# Patient Record
Sex: MALE | Race: WHITE | NOT HISPANIC OR LATINO | Employment: STUDENT | URBAN - METROPOLITAN AREA
[De-identification: names, ages, dates, MRNs, and addresses within clinical notes are randomized per-mention and may not be internally consistent; named-entity substitution may affect disease eponyms.]

---
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
## 2024-01-30 ENCOUNTER — OFFICE VISIT (OUTPATIENT)
Dept: PEDIATRICS | Facility: CLINIC | Age: 19
End: 2024-01-30
Payer: COMMERCIAL

## 2024-01-30 VITALS — TEMPERATURE: 100 F | BODY MASS INDEX: 24.83 KG/M2 | OXYGEN SATURATION: 98 % | WEIGHT: 178 LBS

## 2024-01-30 DIAGNOSIS — B99.9 FEVER DUE TO INFECTION: Primary | ICD-10-CM

## 2024-01-30 LAB — POC RAPID STREP: NEGATIVE

## 2024-01-30 PROCEDURE — 87651 STREP A DNA AMP PROBE: CPT

## 2024-01-30 PROCEDURE — 87880 STREP A ASSAY W/OPTIC: CPT | Performed by: PEDIATRICS

## 2024-01-30 PROCEDURE — 99213 OFFICE O/P EST LOW 20 MIN: CPT | Performed by: PEDIATRICS

## 2024-01-30 PROCEDURE — 87636 SARSCOV2 & INF A&B AMP PRB: CPT

## 2024-01-30 NOTE — PROGRESS NOTES
Subjective   Christian Pat is a 18 y.o. male who presents for Cough (Cough/congestion/flu/covid test-covid symptoms    Here alone ).  Today he is accompanied by alone.     HPI  Chills, ? fever, cough, congested, ST. Fatigue, started 2 days ago.    Objective   Temp 37.8 °C (100 °F) (Tympanic)   Wt 80.7 kg (178 lb)   SpO2 98%   BMI 24.83 kg/m²     Growth percentiles: No height on file for this encounter. 80 %ile (Z= 0.85) based on CDC (Boys, 2-20 Years) weight-for-age data using vitals from 1/30/2024.     Physical Exam  Alert in NAD  Tms clear  Nasal mucosa swollen, + congestion  Post OP erythema  Shotty b/l ant cerv LAD  RRR S1S2  CTAB  Abd soft NTND       Assessment/Plan   Diagnoses and all orders for this visit:  Fever due to infection  -     Sars-CoV-2 and Influenza A/B PCR  -     Group A Streptococcus, PCR  -     POCT rapid strep A      Return to care for new fever, difficulty breathing, decreased hydration/urine output, or other new or worrisome symptoms.

## 2024-01-31 LAB
FLUAV RNA RESP QL NAA+PROBE: NOT DETECTED
FLUBV RNA RESP QL NAA+PROBE: NOT DETECTED
S PYO DNA THROAT QL NAA+PROBE: NOT DETECTED
SARS-COV-2 RNA RESP QL NAA+PROBE: NOT DETECTED

## 2024-02-03 ENCOUNTER — TELEPHONE (OUTPATIENT)
Dept: PEDIATRICS | Facility: CLINIC | Age: 19
End: 2024-02-03
Payer: COMMERCIAL

## 2024-02-03 NOTE — TELEPHONE ENCOUNTER
Pt calling with update-would like to talk to you.    - spoke w/ pt:  no longer fevers but still w/ magaly and white stuff on tonsils - staying hydrated - disc possibility of Mono but more likely a winter URI virus so cont sx tx and discussed when to call

## 2024-12-18 ENCOUNTER — OFFICE VISIT (OUTPATIENT)
Dept: PEDIATRICS | Facility: CLINIC | Age: 19
End: 2024-12-18
Payer: COMMERCIAL

## 2024-12-18 VITALS
HEART RATE: 83 BPM | HEIGHT: 70 IN | TEMPERATURE: 97.6 F | BODY MASS INDEX: 24.95 KG/M2 | WEIGHT: 174.25 LBS | OXYGEN SATURATION: 96 %

## 2024-12-18 DIAGNOSIS — J02.9 PHARYNGITIS, UNSPECIFIED ETIOLOGY: ICD-10-CM

## 2024-12-18 DIAGNOSIS — R53.83 OTHER FATIGUE: ICD-10-CM

## 2024-12-18 DIAGNOSIS — J01.90 ACUTE NON-RECURRENT SINUSITIS, UNSPECIFIED LOCATION: Primary | ICD-10-CM

## 2024-12-18 DIAGNOSIS — J02.9 SORE THROAT: ICD-10-CM

## 2024-12-18 PROBLEM — M79.661 PAIN OF RIGHT LOWER LEG: Status: ACTIVE | Noted: 2024-12-18

## 2024-12-18 PROBLEM — S89.90XA INJURY OF LOWER LEG: Status: ACTIVE | Noted: 2024-12-18

## 2024-12-18 PROBLEM — M21.70 LEG LENGTH DISCREPANCY: Status: ACTIVE | Noted: 2024-12-18

## 2024-12-18 PROBLEM — M51.26 LUMBAR DISC HERNIATION: Status: ACTIVE | Noted: 2024-12-18

## 2024-12-18 PROBLEM — S86.919A MUSCLE STRAIN OF LOWER EXTREMITY: Status: ACTIVE | Noted: 2024-12-18

## 2024-12-18 PROBLEM — M89.8X6 TIBIAL PAIN: Status: ACTIVE | Noted: 2024-12-18

## 2024-12-18 PROBLEM — M67.88 ACHILLES TENDINOSIS: Status: ACTIVE | Noted: 2024-12-18

## 2024-12-18 PROBLEM — M51.369 BULGING LUMBAR DISC: Status: ACTIVE | Noted: 2024-12-18

## 2024-12-18 PROBLEM — S86.011A STRAIN OF RIGHT ACHILLES TENDON: Status: ACTIVE | Noted: 2024-12-18

## 2024-12-18 PROBLEM — M25.519 SHOULDER PAIN: Status: ACTIVE | Noted: 2024-12-18

## 2024-12-18 PROBLEM — S76.309A HAMSTRING INJURY: Status: ACTIVE | Noted: 2024-12-18

## 2024-12-18 PROBLEM — R07.81 RIB PAIN: Status: ACTIVE | Noted: 2024-12-18

## 2024-12-18 PROBLEM — B99.9 FEVER DUE TO INFECTION: Status: ACTIVE | Noted: 2024-12-18

## 2024-12-18 PROBLEM — S93.411A SPRAIN OF CALCANEOFIBULAR LIGAMENT OF RIGHT ANKLE: Status: ACTIVE | Noted: 2024-12-18

## 2024-12-18 PROBLEM — R60.9 SWELLING: Status: ACTIVE | Noted: 2024-12-18

## 2024-12-18 PROBLEM — M62.89 HAMSTRING TIGHTNESS: Status: ACTIVE | Noted: 2024-12-18

## 2024-12-18 PROBLEM — M43.10 RETROLISTHESIS: Status: ACTIVE | Noted: 2024-12-18

## 2024-12-18 PROBLEM — M21.70 INEQUALITY OF LENGTH OF LOWER EXTREMITY: Status: ACTIVE | Noted: 2023-07-05

## 2024-12-18 PROBLEM — S93.491A HIGH ANKLE SPRAIN OF RIGHT LOWER EXTREMITY: Status: ACTIVE | Noted: 2024-12-18

## 2024-12-18 PROBLEM — M21.079 EVERSION DEFORMITY OF FOOT: Status: ACTIVE | Noted: 2024-12-18

## 2024-12-18 PROBLEM — M67.979 ACHILLES TENDON DISORDER: Status: ACTIVE | Noted: 2024-12-18

## 2024-12-18 PROBLEM — M25.569 KNEE PAIN: Status: ACTIVE | Noted: 2024-12-18

## 2024-12-18 PROBLEM — M25.9 DISORDER OF HIP REGION: Status: ACTIVE | Noted: 2024-12-18

## 2024-12-18 PROBLEM — M46.46 DISCITIS OF LUMBAR REGION: Status: ACTIVE | Noted: 2024-12-18

## 2024-12-18 PROBLEM — M79.606 PAIN OF LOWER EXTREMITY: Status: ACTIVE | Noted: 2024-12-18

## 2024-12-18 PROBLEM — M79.651 PAIN OF RIGHT THIGH: Status: ACTIVE | Noted: 2024-12-18

## 2024-12-18 PROBLEM — M25.579 ACUTE ANKLE PAIN: Status: ACTIVE | Noted: 2024-12-18

## 2024-12-18 PROBLEM — S83.521A SPRAIN OF POSTERIOR CRUCIATE LIGAMENT OF RIGHT KNEE: Status: ACTIVE | Noted: 2024-12-18

## 2024-12-18 PROBLEM — M54.50 LOW BACK PAIN, UNSPECIFIED: Status: ACTIVE | Noted: 2024-12-18

## 2024-12-18 PROBLEM — Q66.6 VALGUS DEFORMITY OF BOTH FEET: Status: ACTIVE | Noted: 2024-12-18

## 2024-12-18 PROBLEM — S92.909A FRACTURE OF FOOT: Status: ACTIVE | Noted: 2024-12-18

## 2024-12-18 PROBLEM — M92.521: Status: ACTIVE | Noted: 2024-12-18

## 2024-12-18 PROBLEM — S76.311A RIGHT HAMSTRING MUSCLE STRAIN: Status: ACTIVE | Noted: 2024-12-18

## 2024-12-18 PROBLEM — M47.9 SPONDYLOSIS: Status: ACTIVE | Noted: 2023-07-24

## 2024-12-18 PROBLEM — S83.8X1A INJURY OF MENISCUS OF RIGHT KNEE: Status: ACTIVE | Noted: 2024-12-18

## 2024-12-18 PROBLEM — S86.111A: Status: ACTIVE | Noted: 2024-12-18

## 2024-12-18 LAB — POC RAPID STREP: NEGATIVE

## 2024-12-18 PROCEDURE — 99214 OFFICE O/P EST MOD 30 MIN: CPT | Performed by: PEDIATRICS

## 2024-12-18 PROCEDURE — 87651 STREP A DNA AMP PROBE: CPT

## 2024-12-18 PROCEDURE — 87880 STREP A ASSAY W/OPTIC: CPT | Performed by: PEDIATRICS

## 2024-12-18 PROCEDURE — 3008F BODY MASS INDEX DOCD: CPT | Performed by: PEDIATRICS

## 2024-12-18 RX ORDER — AMOXICILLIN 875 MG/1
875 TABLET, FILM COATED ORAL 2 TIMES DAILY
Qty: 20 TABLET | Refills: 0 | Status: SHIPPED | OUTPATIENT
Start: 2024-12-18 | End: 2024-12-28

## 2024-12-18 RX ORDER — MELOXICAM 15 MG/1
TABLET ORAL
COMMUNITY

## 2024-12-18 RX ORDER — CHOLECALCIFEROL (VITAMIN D3) 125 MCG
CAPSULE ORAL
COMMUNITY

## 2024-12-18 RX ORDER — TIZANIDINE 4 MG/1
TABLET ORAL
COMMUNITY

## 2024-12-18 NOTE — PROGRESS NOTES
"Subjective   History was provided by the patient.  Christian Pat is here today w/ complaint of sore throat.   Symptoms began 2 weeks ago but worse x 3d  Fever is absent  Other associated symptoms have included cough, nasal congestion.  No SOB  Fluid intake is good.    There has not been contact with an individual with known Strept throat.    Current medications include multi-symptom cold medications (Mucinex), no pain meds     Objective   Pulse 83   Temp 36.4 °C (97.6 °F)   Ht 1.765 m (5' 9.5\")   Wt 79 kg (174 lb 4 oz)   SpO2 96%   BMI 25.36 kg/m²   General: alert, active, in no acute distress  Eyes:  scleral injection No  Ears: TM's normal, external auditory canals are clear   Nose: turbinates erythematous  Throat: tonsils: 3+  and without exudates, moderate erythema  Neck: supple, adenopathy present: single,  left, anterior cervical, medium, mobile, tender  Lungs: good aeration throughout all lung fields, no retractions, no nasal flaring, and clear breath sounds bilaterally  Heart: regular rate and rhythm, normal S1 and S2, no murmur  Abd:  soft, nontender, or no HSM    Assessment/Plan   Christian Pat is a 19 y.o. male here w/ viral syndr vs ?sinusitis + fatigue  QS negative.  Strept PCR ordered.  If ordered, parents/pt told to check in MyChart for result and if POS then we will contact them with antibiotic instructions.  Recommended OTC tx and fluids  Amoxicillin x 10d  "

## 2024-12-18 NOTE — ASSESSMENT & PLAN NOTE
- occurred in hs during fb - L4-S1  - on mm relaxer tizanidine still  - rx per ortho (but hasn't seen them in a while)

## 2024-12-18 NOTE — PROGRESS NOTES
"Subjective   History was provided by the  pt .  Christian Pat is a 19 y.o. male who is here for this well visit.  - no WCC > 2.5yrs or more  - Flu + lipids + HIV + hrg  - gave VIS for HPV + Nancy     Current Issues:  Current concerns include:  on amox for presumed sinusitis x 5/10d, feeling much better  - L CP x 90m o/n, last night and 1+wks prior, usually around dinner or after, no hx GERD but ? - no tx tried so disc trying Tums and staying upright and disc when to call  - optometry/glasses  Lumbar disc herniation  - occurred in hs during fb - L4-S1  - on mm relaxer tizanidine still  - rx per ortho (but hasn't seen them in a while)    High ankle sprain of right lower extremity  - still some pain on/off but not limiting    Sleep: all night  Dental:  brushes teeth 2x/d - sees dentist  No issues using restroom  Testicular self-exams discussed    Review of Nutrition:  Current diet: adequate milk/Vit D source   Adequate fruit/vegetable intake    Social Screening:   Grade:  soph[employer] for:  finance  Activities:  gets regular exercise (lift and bask)  Job:  Yes    Safety:  Risk factors for sexually-transmitted infections: sexually active - F only - no hx STD - denies needing tested today  Using alcohol/drug use:  yes - EtOH 1-2x/wk - mj much less  Tobacco/nicotine use:  no use  Uses seat belt - no texting while driving    Screening Questions:  Mood (see PHQ9): good    Objective   /83   Pulse 76   Ht 1.753 m (5' 9\")   Wt 79.8 kg (176 lb)   BMI 25.99 kg/m²   Physical Exam  Constitutional:       Appearance: Normal appearance.   HENT:      Right Ear: Tympanic membrane normal.      Left Ear: Tympanic membrane normal.      Nose: Nose normal.      Mouth/Throat:      Mouth: Mucous membranes are moist.      Pharynx: Oropharynx is clear.   Eyes:      Extraocular Movements: Extraocular movements intact.   Cardiovascular:      Rate and Rhythm: Normal rate and regular rhythm.      Pulses:           Radial pulses are 2+ on " the right side and 2+ on the left side.      Heart sounds: No murmur heard.  Pulmonary:      Effort: Pulmonary effort is normal.      Breath sounds: Normal breath sounds.   Abdominal:      General: Abdomen is flat.      Palpations: Abdomen is soft. There is no hepatomegaly, splenomegaly or mass.   Genitourinary:     Penis: Normal.       Testes: Normal.         Right: Testicular hydrocele not present.         Left: Testicular hydrocele not present.      Nestor stage (genital): 5.   Musculoskeletal:         General: Normal range of motion.      Cervical back: Normal range of motion and neck supple.      Thoracic back: No scoliosis.      Lumbar back: No scoliosis.   Lymphadenopathy:      Cervical: No cervical adenopathy.   Skin:     General: Skin is warm and dry.   Neurological:      General: No focal deficit present.      Mental Status: He is alert.      Deep Tendon Reflexes:      Reflex Scores:       Patellar reflexes are 2+ on the right side and 2+ on the left side.  Psychiatric:         Mood and Affect: Mood normal.         Behavior: Behavior normal.       Assessment/Plan   19 y.o. male w/ NL G+D  1. Anticipatory guidance discussed.   2. Cleared for school/sports  3. Vaccine, if given, discussed and consented.  4. Follow up in 1 year for next well child exam or sooner with concerns.

## 2024-12-19 LAB — S PYO DNA THROAT QL NAA+PROBE: NOT DETECTED

## 2024-12-23 ENCOUNTER — APPOINTMENT (OUTPATIENT)
Dept: PEDIATRICS | Facility: CLINIC | Age: 19
End: 2024-12-23
Payer: COMMERCIAL

## 2024-12-23 VITALS
WEIGHT: 176 LBS | SYSTOLIC BLOOD PRESSURE: 118 MMHG | HEIGHT: 69 IN | DIASTOLIC BLOOD PRESSURE: 83 MMHG | BODY MASS INDEX: 26.07 KG/M2 | HEART RATE: 76 BPM

## 2024-12-23 DIAGNOSIS — Z23 NEED FOR VACCINATION: ICD-10-CM

## 2024-12-23 DIAGNOSIS — Z11.4 SCREENING FOR HIV (HUMAN IMMUNODEFICIENCY VIRUS): ICD-10-CM

## 2024-12-23 DIAGNOSIS — Z13.220 LIPID SCREENING: ICD-10-CM

## 2024-12-23 DIAGNOSIS — M47.9 SPONDYLOSIS: ICD-10-CM

## 2024-12-23 DIAGNOSIS — Z00.00 WELL ADULT EXAM: Primary | ICD-10-CM

## 2024-12-23 DIAGNOSIS — Z23 NEED FOR INFLUENZA VACCINATION: ICD-10-CM

## 2024-12-23 PROBLEM — M67.88 ACHILLES TENDINOSIS: Status: RESOLVED | Noted: 2024-12-18 | Resolved: 2024-12-23

## 2024-12-23 PROBLEM — L70.9 ACNE: Status: RESOLVED | Noted: 2022-05-23 | Resolved: 2024-12-23

## 2024-12-23 PROBLEM — S83.521A SPRAIN OF POSTERIOR CRUCIATE LIGAMENT OF RIGHT KNEE: Status: RESOLVED | Noted: 2024-12-18 | Resolved: 2024-12-23

## 2024-12-23 PROBLEM — M21.079 EVERSION DEFORMITY OF FOOT: Status: RESOLVED | Noted: 2024-12-18 | Resolved: 2024-12-23

## 2024-12-23 PROBLEM — S86.111A: Status: RESOLVED | Noted: 2024-12-18 | Resolved: 2024-12-23

## 2024-12-23 PROBLEM — M51.26 LUMBAR DISC HERNIATION: Status: RESOLVED | Noted: 2024-12-18 | Resolved: 2024-12-23

## 2024-12-23 PROBLEM — S76.311A RIGHT HAMSTRING MUSCLE STRAIN: Status: RESOLVED | Noted: 2024-12-18 | Resolved: 2024-12-23

## 2024-12-23 PROBLEM — S93.411A SPRAIN OF CALCANEOFIBULAR LIGAMENT OF RIGHT ANKLE: Status: RESOLVED | Noted: 2024-12-18 | Resolved: 2024-12-23

## 2024-12-23 PROBLEM — M92.521: Status: RESOLVED | Noted: 2024-12-18 | Resolved: 2024-12-23

## 2024-12-23 PROBLEM — S83.8X1A INJURY OF MENISCUS OF RIGHT KNEE: Status: RESOLVED | Noted: 2024-12-18 | Resolved: 2024-12-23

## 2024-12-23 PROBLEM — S93.491A HIGH ANKLE SPRAIN OF RIGHT LOWER EXTREMITY: Status: RESOLVED | Noted: 2024-12-18 | Resolved: 2024-12-23

## 2024-12-23 PROBLEM — S86.011A STRAIN OF RIGHT ACHILLES TENDON: Status: RESOLVED | Noted: 2024-12-18 | Resolved: 2024-12-23

## 2024-12-23 PROCEDURE — 92552 PURE TONE AUDIOMETRY AIR: CPT | Performed by: PEDIATRICS

## 2024-12-23 PROCEDURE — 90471 IMMUNIZATION ADMIN: CPT | Performed by: PEDIATRICS

## 2024-12-23 PROCEDURE — 90656 IIV3 VACC NO PRSV 0.5 ML IM: CPT | Performed by: PEDIATRICS

## 2024-12-23 PROCEDURE — 1036F TOBACCO NON-USER: CPT | Performed by: PEDIATRICS

## 2024-12-23 PROCEDURE — 90620 MENB-4C VACCINE IM: CPT | Performed by: PEDIATRICS

## 2024-12-23 PROCEDURE — 90472 IMMUNIZATION ADMIN EACH ADD: CPT | Performed by: PEDIATRICS

## 2024-12-23 PROCEDURE — 3008F BODY MASS INDEX DOCD: CPT | Performed by: PEDIATRICS

## 2024-12-23 PROCEDURE — 99395 PREV VISIT EST AGE 18-39: CPT | Performed by: PEDIATRICS

## 2024-12-23 PROCEDURE — 96127 BRIEF EMOTIONAL/BEHAV ASSMT: CPT | Performed by: PEDIATRICS

## 2025-05-15 ENCOUNTER — TELEPHONE (OUTPATIENT)
Dept: PEDIATRICS | Facility: CLINIC | Age: 20
End: 2025-05-15
Payer: COMMERCIAL

## 2025-05-15 NOTE — TELEPHONE ENCOUNTER
Rx Refill Request Telephone Encounter    Name:  Christian Pat  :  [id number]  Medication Name:  montelukast (Singulair) 10 mg tablet     Specific Pharmacy location:    [facility] #[id number]     Date of last appointment:  24  Date of next appointment:  N/A  Best number to reach patient:  [phone]          Pt called asking for refill of this medication for his allergies